# Patient Record
Sex: FEMALE | Race: WHITE
[De-identification: names, ages, dates, MRNs, and addresses within clinical notes are randomized per-mention and may not be internally consistent; named-entity substitution may affect disease eponyms.]

---

## 2020-10-02 ENCOUNTER — HOSPITAL ENCOUNTER (OUTPATIENT)
Dept: HOSPITAL 47 - RADPROMAIN | Age: 80
Discharge: HOME | End: 2020-10-02
Attending: INTERNAL MEDICINE
Payer: MEDICARE

## 2020-10-02 VITALS — TEMPERATURE: 97.4 F | RESPIRATION RATE: 16 BRPM

## 2020-10-02 VITALS — DIASTOLIC BLOOD PRESSURE: 76 MMHG | SYSTOLIC BLOOD PRESSURE: 157 MMHG | HEART RATE: 66 BPM

## 2020-10-02 DIAGNOSIS — M43.12: Primary | ICD-10-CM

## 2020-10-02 DIAGNOSIS — M46.02: ICD-10-CM

## 2020-10-02 PROCEDURE — 62302 MYELOGRAPHY LUMBAR INJECTION: CPT

## 2020-10-02 PROCEDURE — 72126 CT NECK SPINE W/DYE: CPT

## 2020-10-02 NOTE — FL
EXAMINATION TYPE: FL myelogram cervical

 

DATE OF EXAM: 10/2/2020

 

CLINICAL HISTORY: Neck and back pain. Recent tripping injury. History of prior surgeries. Spondylosis
 without myelopathy. Radiculopathy.

 

TECHNIQUE: Fluoroscopic assisted lumbar puncture for subsequent contrast-enhanced CT cervical spine.

 

COMPARISON:  None.

 

FINDINGS:  Fluoroscopic guidance was provided during lumbar puncture procedure performed by myself.  
A total of 79 seconds of fluoroscopic time was utilized during the procedure and 3 spot images are ac
quired.

 

Procedure explained to patient. Benefits, alternatives, and risks were discussed. Informed consent wa
s obtained.

 

Patient placed on the table. L3 level was targeted using paraspinal approach. Posterior interpedicula
r rods and screws noted L4-S1 level. Moderate disc space narrowing L3-L4 level. Moderate disc space n
arrowing L1-L2 level. Timeout was performed. Overlying skin is cleansed with Betadine. Lidocaine is u
sed as anesthetic into the skin and deeper tissue. Under fluoroscopic guidance spinal needle was adva
nced into spinal canal. There is successful injection of 14 cc of [id number]. At this point needle w
as withdrawn. Patient tolerated procedure well without any immediate complication. 

 

Images saved show successful contrast opacification of spinal canal and subsequent passage of cervica
l levels after lowering head of table. Partial visualization of metallic hardware right shoulder leve
l incidentally noted. Left-sided neck stimulator noted.

 

Patient taken to CT for study. This report dictated separately.

 

Vital signs monitored before and during and after procedure. Transient hypertension noted. Patient re
mained asymptomatic.

 

IMPRESSION: Successful fluoroscopic assisted myelogram for subsequent CT cervical spine study.

## 2020-10-02 NOTE — CT
EXAMINATION TYPE: CT cervical spine w con

 

DATE OF EXAM: 10/2/2020

 

COMPARISON: NONE at this institution.

 

HISTORY: Neck pain

 

CT DLP: 380 mGycm.  Automated Exposure Control for Dose Reduction was Utilized.

 

TECHNIQUE:  CT scan of the cervical spine is obtained without IV contrast, axial images are obtained,
 sagittal and coronal reformatted images are also reviewed. Patient is injected with intrathecal cont
rast.

 

FINDINGS: Coronal images show slight dextroconvex scoliotic curvature centered C6 level and levoconve
x scoliotic curvature centered at T3 level. Sagittal images show grade 1 retrolisthesis C4 on C5. Antoinette
tebral body heights are maintained. There is severe disc space narrowing with some ossific fusion of 
the C6 and C7 vertebra. There is severe disc space narrowing of the C5-C6 vertebra. Moderate to sever
e disc space narrowing and moderate spurring C4-C5 level. Mild-to-moderate disc space narrowing and s
purring C3-C4 level. Satisfactory contrast opacification of cervical spinal canal. Cervical cord is n
ormal in caliber. There are posterior surgical change at C2 level noted. There is desired ossific fus
ion of the posterior C1 and C2 elements. Widening of the C3-C4 spinous process space incidentally not
ed.

 

Axial images at C2-C3 level show artifact from posterior fusion hardware. There is posterior marginal
 spurring causing moderate left and mild right-sided neural foraminal narrowing. No significant focal
 herniation.

 

Axial images at C3-C4 level show marginal spurring causing moderate to severe left greater than right
 neural foraminal narrowing. No significant focal disc herniation

 

Axial images at the C4-C5 level show right paracentral spurring or bony projection with marginal spur
ring causing moderate left greater than right neural foraminal narrowing and effacement of the right 
anterolateral thecal sac. Spondylolisthesis is present.

 

Axial images at C5-C6 level show marginal spurring causing severe left greater than right bilateral n
eural foraminal narrowing.

 

Axial images at C6-C7 level are felt within normal limits except for the ossific fusion.

 

Axial images at C7-T1 levels show grade 1 anterolisthesis C7 on T1. Patent bilateral neural foramina,
 spinal canal preserved.

 

Localizer shows additional posterior left stimulator and hardware from shoulder surgery.

 

Thyroid gland is felt within normal limits. Lung apices show no pneumothorax. Moderate to severe calc
ified plaque bilateral carotid bulb level noted. Consider carotid ultrasound follow-up to better eval
uate and characterize if this is not known finding.

 

IMPRESSION: Prominent spondylolisthesis and posterior spurring or bony projection with marginal spurr
ing is at C4-C5 level as detailed above.